# Patient Record
Sex: FEMALE | Race: WHITE | URBAN - METROPOLITAN AREA
[De-identification: names, ages, dates, MRNs, and addresses within clinical notes are randomized per-mention and may not be internally consistent; named-entity substitution may affect disease eponyms.]

---

## 2023-10-18 LAB
C. TRACHOMATIS, EXTERNAL RESULT: NEGATIVE
N. GONORRHOEAE, EXTERNAL RESULT: NEGATIVE

## 2023-11-09 LAB
ABO, EXTERNAL RESULT: NORMAL
HEP B, EXTERNAL RESULT: NEGATIVE
HEPATITIS C ANTIBODY, EXTERNAL RESULT: NEGATIVE
RH FACTOR, EXTERNAL RESULT: NEGATIVE
RUBELLA TITER, EXTERNAL RESULT: NORMAL
T. PALLIDUM (SYPHILIS) ANTIBODY, EXTERNAL RESULT: NON REACTIVE

## 2024-05-03 LAB — GBS, EXTERNAL RESULT: POSITIVE

## 2024-05-30 ENCOUNTER — HOSPITAL ENCOUNTER (INPATIENT)
Age: 29
LOS: 2 days | Discharge: HOME | End: 2024-06-01
Attending: OBSTETRICS & GYNECOLOGY | Admitting: OBSTETRICS & GYNECOLOGY
Payer: COMMERCIAL

## 2024-05-30 ENCOUNTER — APPOINTMENT (OUTPATIENT)
Dept: LABOR AND DELIVERY | Age: 29
End: 2024-05-30
Payer: COMMERCIAL

## 2024-05-30 ENCOUNTER — ANESTHESIA EVENT (OUTPATIENT)
Dept: LABOR AND DELIVERY | Age: 29
End: 2024-05-30
Payer: COMMERCIAL

## 2024-05-30 ENCOUNTER — ANESTHESIA (OUTPATIENT)
Dept: LABOR AND DELIVERY | Age: 29
End: 2024-05-30
Payer: COMMERCIAL

## 2024-05-30 LAB
ABO + RH BLD: NORMAL
AMPHETAMINES UR QL SCN>1000 NG/ML: NORMAL
ANTIBODY SCREEN: NORMAL
BARBITURATES UR QL SCN>200 NG/ML: NORMAL
BASOPHILS # BLD: 0 K/UL (ref 0–0.2)
BASOPHILS NFR BLD: 0.1 %
BENZODIAZ UR QL SCN>200 NG/ML: NORMAL
BLD GP AB SCN SERPL QL: NORMAL
BUPRENORPHINE+NOR UR QL SCN: NORMAL
CANNABINOIDS UR QL SCN>50 NG/ML: NORMAL
COCAINE UR QL SCN: NORMAL
DEPRECATED RDW RBC AUTO: 13.9 % (ref 12.4–15.4)
DRUG SCREEN COMMENT UR-IMP: NORMAL
EOSINOPHIL # BLD: 0.1 K/UL (ref 0–0.6)
EOSINOPHIL NFR BLD: 1.1 %
FENTANYL SCREEN, URINE: NORMAL
HCT VFR BLD AUTO: 35.4 % (ref 36–48)
HGB BLD-MCNC: 12 G/DL (ref 12–16)
LYMPHOCYTES # BLD: 1.7 K/UL (ref 1–5.1)
LYMPHOCYTES NFR BLD: 15.7 %
MCH RBC QN AUTO: 30.2 PG (ref 26–34)
MCHC RBC AUTO-ENTMCNC: 33.9 G/DL (ref 31–36)
MCV RBC AUTO: 89 FL (ref 80–100)
METHADONE UR QL SCN>300 NG/ML: NORMAL
MONOCYTES # BLD: 0.9 K/UL (ref 0–1.3)
MONOCYTES NFR BLD: 8.4 %
NEUTROPHILS # BLD: 8 K/UL (ref 1.7–7.7)
NEUTROPHILS NFR BLD: 74.7 %
OPIATES UR QL SCN>300 NG/ML: NORMAL
OXYCODONE UR QL SCN: NORMAL
PCP UR QL SCN>25 NG/ML: NORMAL
PH UR STRIP: 6 [PH]
PLATELET # BLD AUTO: 237 K/UL (ref 135–450)
PMV BLD AUTO: 7.9 FL (ref 5–10.5)
RBC # BLD AUTO: 3.98 M/UL (ref 4–5.2)
WBC # BLD AUTO: 10.7 K/UL (ref 4–11)

## 2024-05-30 PROCEDURE — 6360000002 HC RX W HCPCS: Performed by: OBSTETRICS & GYNECOLOGY

## 2024-05-30 PROCEDURE — 86850 RBC ANTIBODY SCREEN: CPT

## 2024-05-30 PROCEDURE — 86900 BLOOD TYPING SEROLOGIC ABO: CPT

## 2024-05-30 PROCEDURE — 2500000003 HC RX 250 WO HCPCS: Performed by: NURSE ANESTHETIST, CERTIFIED REGISTERED

## 2024-05-30 PROCEDURE — 86901 BLOOD TYPING SEROLOGIC RH(D): CPT

## 2024-05-30 PROCEDURE — 6360000002 HC RX W HCPCS: Performed by: NURSE ANESTHETIST, CERTIFIED REGISTERED

## 2024-05-30 PROCEDURE — 86780 TREPONEMA PALLIDUM: CPT

## 2024-05-30 PROCEDURE — 1220000000 HC SEMI PRIVATE OB R&B

## 2024-05-30 PROCEDURE — 85025 COMPLETE CBC W/AUTO DIFF WBC: CPT

## 2024-05-30 PROCEDURE — 2580000003 HC RX 258: Performed by: OBSTETRICS & GYNECOLOGY

## 2024-05-30 PROCEDURE — 80307 DRUG TEST PRSMV CHEM ANLYZR: CPT

## 2024-05-30 RX ORDER — METHYLERGONOVINE MALEATE 0.2 MG/ML
200 INJECTION INTRAVENOUS PRN
Status: DISCONTINUED | OUTPATIENT
Start: 2024-05-30 | End: 2024-05-31

## 2024-05-30 RX ORDER — FLUTICASONE PROPIONATE 50 MCG
1 SPRAY, SUSPENSION (ML) NASAL DAILY
COMMUNITY

## 2024-05-30 RX ORDER — ONDANSETRON 2 MG/ML
4 INJECTION INTRAMUSCULAR; INTRAVENOUS EVERY 6 HOURS PRN
Status: DISCONTINUED | OUTPATIENT
Start: 2024-05-30 | End: 2024-05-31

## 2024-05-30 RX ORDER — BUPIVACAINE HYDROCHLORIDE 2.5 MG/ML
INJECTION, SOLUTION EPIDURAL; INFILTRATION; INTRACAUDAL
Status: COMPLETED
Start: 2024-05-30 | End: 2024-05-30

## 2024-05-30 RX ORDER — ONDANSETRON 4 MG/1
4 TABLET, ORALLY DISINTEGRATING ORAL EVERY 6 HOURS PRN
Status: DISCONTINUED | OUTPATIENT
Start: 2024-05-30 | End: 2024-05-31

## 2024-05-30 RX ORDER — BUPIVACAINE HYDROCHLORIDE 2.5 MG/ML
INJECTION, SOLUTION EPIDURAL; INFILTRATION; INTRACAUDAL PRN
Status: DISCONTINUED | OUTPATIENT
Start: 2024-05-30 | End: 2024-05-31 | Stop reason: SDUPTHER

## 2024-05-30 RX ORDER — SODIUM CHLORIDE 9 MG/ML
25 INJECTION, SOLUTION INTRAVENOUS PRN
Status: DISCONTINUED | OUTPATIENT
Start: 2024-05-30 | End: 2024-05-31

## 2024-05-30 RX ORDER — TRANEXAMIC ACID 10 MG/ML
1000 INJECTION, SOLUTION INTRAVENOUS
Status: DISCONTINUED | OUTPATIENT
Start: 2024-05-30 | End: 2024-05-31

## 2024-05-30 RX ORDER — CARBOPROST TROMETHAMINE 250 UG/ML
250 INJECTION, SOLUTION INTRAMUSCULAR PRN
Status: DISCONTINUED | OUTPATIENT
Start: 2024-05-30 | End: 2024-05-31

## 2024-05-30 RX ORDER — SODIUM CHLORIDE, SODIUM LACTATE, POTASSIUM CHLORIDE, CALCIUM CHLORIDE 600; 310; 30; 20 MG/100ML; MG/100ML; MG/100ML; MG/100ML
INJECTION, SOLUTION INTRAVENOUS CONTINUOUS
Status: DISCONTINUED | OUTPATIENT
Start: 2024-05-30 | End: 2024-05-31

## 2024-05-30 RX ORDER — SODIUM CHLORIDE 0.9 % (FLUSH) 0.9 %
5-40 SYRINGE (ML) INJECTION PRN
Status: DISCONTINUED | OUTPATIENT
Start: 2024-05-30 | End: 2024-05-31

## 2024-05-30 RX ORDER — TERBUTALINE SULFATE 1 MG/ML
0.25 INJECTION, SOLUTION SUBCUTANEOUS
Status: DISCONTINUED | OUTPATIENT
Start: 2024-05-30 | End: 2024-05-31

## 2024-05-30 RX ORDER — SODIUM CHLORIDE, SODIUM LACTATE, POTASSIUM CHLORIDE, AND CALCIUM CHLORIDE .6; .31; .03; .02 G/100ML; G/100ML; G/100ML; G/100ML
1000 INJECTION, SOLUTION INTRAVENOUS PRN
Status: DISCONTINUED | OUTPATIENT
Start: 2024-05-30 | End: 2024-05-31

## 2024-05-30 RX ORDER — DOCUSATE SODIUM 100 MG/1
100 CAPSULE, LIQUID FILLED ORAL 2 TIMES DAILY
Status: DISCONTINUED | OUTPATIENT
Start: 2024-05-30 | End: 2024-05-31

## 2024-05-30 RX ORDER — SODIUM CHLORIDE 0.9 % (FLUSH) 0.9 %
5-40 SYRINGE (ML) INJECTION EVERY 12 HOURS SCHEDULED
Status: DISCONTINUED | OUTPATIENT
Start: 2024-05-30 | End: 2024-05-31

## 2024-05-30 RX ORDER — SODIUM CHLORIDE, SODIUM LACTATE, POTASSIUM CHLORIDE, AND CALCIUM CHLORIDE .6; .31; .03; .02 G/100ML; G/100ML; G/100ML; G/100ML
500 INJECTION, SOLUTION INTRAVENOUS PRN
Status: DISCONTINUED | OUTPATIENT
Start: 2024-05-30 | End: 2024-05-31

## 2024-05-30 RX ORDER — MISOPROSTOL 100 UG/1
400 TABLET ORAL PRN
Status: DISCONTINUED | OUTPATIENT
Start: 2024-05-30 | End: 2024-05-31

## 2024-05-30 RX ADMIN — SODIUM CHLORIDE, POTASSIUM CHLORIDE, SODIUM LACTATE AND CALCIUM CHLORIDE: 600; 310; 30; 20 INJECTION, SOLUTION INTRAVENOUS at 08:58

## 2024-05-30 RX ADMIN — Medication 1 MILLI-UNITS/MIN: at 09:34

## 2024-05-30 RX ADMIN — Medication 2.5 MILLION UNITS: at 16:31

## 2024-05-30 RX ADMIN — DEXTROSE MONOHYDRATE 5 MILLION UNITS: 5 INJECTION INTRAVENOUS at 08:58

## 2024-05-30 RX ADMIN — BUPIVACAINE HYDROCHLORIDE 1 ML: 2.5 INJECTION, SOLUTION EPIDURAL; INFILTRATION; INTRACAUDAL; PERINEURAL at 22:35

## 2024-05-30 RX ADMIN — Medication 2.5 MILLION UNITS: at 12:51

## 2024-05-30 RX ADMIN — Medication 15 ML/HR: at 22:39

## 2024-05-30 RX ADMIN — Medication 2.5 MILLION UNITS: at 20:32

## 2024-05-30 NOTE — H&P
Department of Obstetrics and Gynecology   Obstetrics History and Physical        CHIEF COMPLAINT:  induction    HISTORY OF PRESENT ILLNESS:      The patient is a 28 y.o. female at 40w2d.  OB History          3    Para   1    Term   1            AB   1    Living             SAB   1    IAB        Ectopic        Molar        Multiple        Live Births                Patient presents with a chief complaint as above and is being admitted for induction    GBS positive     Rh neg- rhogam 3/8/24      Estimated Due Date: Estimated Date of Delivery: 24        PAST OB HISTORY:  OB History          3    Para   1    Term   1            AB   1    Living             SAB   1    IAB        Ectopic        Molar        Multiple        Live Births                    Past Medical History:        Diagnosis Date    Transient synovitis, right ankle and foot 2019     Past Surgical History:        Procedure Laterality Date    ANTERIOR CRUCIATE LIGAMENT REPAIR Bilateral     WISDOM TOOTH EXTRACTION       Allergies:  Patient has no known allergies.    Social History:    Social History     Socioeconomic History    Marital status: Single     Spouse name: Not on file    Number of children: Not on file    Years of education: Not on file    Highest education level: Not on file   Occupational History    Not on file   Tobacco Use    Smoking status: Never    Smokeless tobacco: Never   Substance and Sexual Activity    Alcohol use: Not on file    Drug use: Not on file    Sexual activity: Not on file   Other Topics Concern    Not on file   Social History Narrative    Not on file     Social Determinants of Health     Financial Resource Strain: Not on file   Food Insecurity: No Food Insecurity (2024)    Hunger Vital Sign     Worried About Running Out of Food in the Last Year: Never true     Ran Out of Food in the Last Year: Never true   Transportation Needs: No Transportation Needs (2024)    PRAPARE -

## 2024-05-30 NOTE — PLAN OF CARE
Problem: Vaginal Birth or  Section  Goal: Fetal and maternal status remain reassuring during the birth process  Description:  Birth OB-Pregnancy care plan goal which identifies if the fetal and maternal status remain reassuring during the birth process  2024 by Marge Voss RN  Outcome: Progressing  2024 by Wanda Holguin RN  Outcome: Progressing     Problem: Pain  Goal: Verbalizes/displays adequate comfort level or baseline comfort level  2024 by Marge Voss RN  Outcome: Progressing  2024 by Wanda Holguin RN  Outcome: Progressing     Problem: Infection - Adult  Goal: Absence of infection at discharge  2024 by Wanda Holguin RN  Outcome: Progressing     Problem: Safety - Adult  Goal: Free from fall injury  2024 by Marge Voss RN  Outcome: Progressing  2024 by Wanda Holguin RN  Outcome: Progressing

## 2024-05-30 NOTE — PROGRESS NOTES
Getting uncomfortable with ctx   Vitals:    24 1829   BP: 108/69   Pulse: 74   Resp:    Temp:    SpO2:      Cat 1 FHTs   Ctx q2-4min   SVE 3-4/60/-2, AROM clear fluid    Pit at 7     29yo  at 40wk2d IOL   - continue pitocin titration     Dior Miller MD

## 2024-05-31 LAB — REAGIN+T PALLIDUM IGG+IGM SERPL-IMP: NORMAL

## 2024-05-31 PROCEDURE — 7200000001 HC VAGINAL DELIVERY

## 2024-05-31 PROCEDURE — 3E033VJ INTRODUCTION OF OTHER HORMONE INTO PERIPHERAL VEIN, PERCUTANEOUS APPROACH: ICD-10-PCS | Performed by: OBSTETRICS & GYNECOLOGY

## 2024-05-31 PROCEDURE — 2580000003 HC RX 258: Performed by: OBSTETRICS & GYNECOLOGY

## 2024-05-31 PROCEDURE — 2500000003 HC RX 250 WO HCPCS: Performed by: NURSE ANESTHETIST, CERTIFIED REGISTERED

## 2024-05-31 PROCEDURE — 1220000000 HC SEMI PRIVATE OB R&B

## 2024-05-31 PROCEDURE — 0KQM0ZZ REPAIR PERINEUM MUSCLE, OPEN APPROACH: ICD-10-PCS | Performed by: OBSTETRICS & GYNECOLOGY

## 2024-05-31 PROCEDURE — 51702 INSERT TEMP BLADDER CATH: CPT

## 2024-05-31 PROCEDURE — 6370000000 HC RX 637 (ALT 250 FOR IP): Performed by: OBSTETRICS & GYNECOLOGY

## 2024-05-31 PROCEDURE — 6360000002 HC RX W HCPCS: Performed by: OBSTETRICS & GYNECOLOGY

## 2024-05-31 PROCEDURE — 6360000002 HC RX W HCPCS: Performed by: NURSE ANESTHETIST, CERTIFIED REGISTERED

## 2024-05-31 PROCEDURE — 10907ZC DRAINAGE OF AMNIOTIC FLUID, THERAPEUTIC FROM PRODUCTS OF CONCEPTION, VIA NATURAL OR ARTIFICIAL OPENING: ICD-10-PCS | Performed by: OBSTETRICS & GYNECOLOGY

## 2024-05-31 RX ORDER — SIMETHICONE 80 MG
80 TABLET,CHEWABLE ORAL EVERY 6 HOURS PRN
Status: DISCONTINUED | OUTPATIENT
Start: 2024-05-31 | End: 2024-06-01 | Stop reason: HOSPADM

## 2024-05-31 RX ORDER — LIDOCAINE HYDROCHLORIDE 20 MG/ML
INJECTION, SOLUTION EPIDURAL; INFILTRATION; INTRACAUDAL; PERINEURAL PRN
Status: DISCONTINUED | OUTPATIENT
Start: 2024-05-31 | End: 2024-05-31 | Stop reason: SDUPTHER

## 2024-05-31 RX ORDER — FERROUS SULFATE 325(65) MG
325 TABLET ORAL 2 TIMES DAILY WITH MEALS
Status: DISCONTINUED | OUTPATIENT
Start: 2024-05-31 | End: 2024-06-01 | Stop reason: HOSPADM

## 2024-05-31 RX ORDER — LANOLIN 100 %
OINTMENT (GRAM) TOPICAL PRN
Status: DISCONTINUED | OUTPATIENT
Start: 2024-05-31 | End: 2024-06-01 | Stop reason: HOSPADM

## 2024-05-31 RX ORDER — SODIUM CHLORIDE, SODIUM LACTATE, POTASSIUM CHLORIDE, CALCIUM CHLORIDE 600; 310; 30; 20 MG/100ML; MG/100ML; MG/100ML; MG/100ML
INJECTION, SOLUTION INTRAVENOUS CONTINUOUS
Status: DISCONTINUED | OUTPATIENT
Start: 2024-05-31 | End: 2024-06-01 | Stop reason: HOSPADM

## 2024-05-31 RX ORDER — SODIUM CHLORIDE 9 MG/ML
INJECTION, SOLUTION INTRAVENOUS PRN
Status: DISCONTINUED | OUTPATIENT
Start: 2024-05-31 | End: 2024-06-01 | Stop reason: HOSPADM

## 2024-05-31 RX ORDER — DEXMEDETOMIDINE HYDROCHLORIDE 100 UG/ML
INJECTION, SOLUTION INTRAVENOUS PRN
Status: DISCONTINUED | OUTPATIENT
Start: 2024-05-31 | End: 2024-05-31 | Stop reason: SDUPTHER

## 2024-05-31 RX ORDER — DOCUSATE SODIUM 100 MG/1
100 CAPSULE, LIQUID FILLED ORAL 2 TIMES DAILY PRN
Status: DISCONTINUED | OUTPATIENT
Start: 2024-05-31 | End: 2024-06-01 | Stop reason: HOSPADM

## 2024-05-31 RX ORDER — SODIUM CHLORIDE 0.9 % (FLUSH) 0.9 %
5-40 SYRINGE (ML) INJECTION PRN
Status: DISCONTINUED | OUTPATIENT
Start: 2024-05-31 | End: 2024-06-01 | Stop reason: HOSPADM

## 2024-05-31 RX ORDER — IBUPROFEN 600 MG/1
600 TABLET ORAL EVERY 8 HOURS PRN
Status: COMPLETED | OUTPATIENT
Start: 2024-05-31 | End: 2024-05-31

## 2024-05-31 RX ORDER — BUPIVACAINE HYDROCHLORIDE 2.5 MG/ML
INJECTION, SOLUTION EPIDURAL; INFILTRATION; INTRACAUDAL PRN
Status: DISCONTINUED | OUTPATIENT
Start: 2024-05-31 | End: 2024-05-31 | Stop reason: SDUPTHER

## 2024-05-31 RX ORDER — OXYCODONE HYDROCHLORIDE 5 MG/1
5 TABLET ORAL EVERY 4 HOURS PRN
Status: DISCONTINUED | OUTPATIENT
Start: 2024-05-31 | End: 2024-06-01 | Stop reason: HOSPADM

## 2024-05-31 RX ORDER — SODIUM CHLORIDE 0.9 % (FLUSH) 0.9 %
5-40 SYRINGE (ML) INJECTION EVERY 12 HOURS SCHEDULED
Status: DISCONTINUED | OUTPATIENT
Start: 2024-05-31 | End: 2024-06-01 | Stop reason: HOSPADM

## 2024-05-31 RX ORDER — IBUPROFEN 600 MG/1
600 TABLET ORAL EVERY 6 HOURS PRN
Status: DISCONTINUED | OUTPATIENT
Start: 2024-05-31 | End: 2024-06-01 | Stop reason: HOSPADM

## 2024-05-31 RX ORDER — KETOROLAC TROMETHAMINE 30 MG/ML
30 INJECTION, SOLUTION INTRAMUSCULAR; INTRAVENOUS EVERY 6 HOURS PRN
Status: COMPLETED | OUTPATIENT
Start: 2024-05-31 | End: 2024-05-31

## 2024-05-31 RX ORDER — BUPIVACAINE HYDROCHLORIDE 5 MG/ML
INJECTION, SOLUTION EPIDURAL; INTRACAUDAL PRN
Status: DISCONTINUED | OUTPATIENT
Start: 2024-05-31 | End: 2024-05-31 | Stop reason: SDUPTHER

## 2024-05-31 RX ORDER — ACETAMINOPHEN 500 MG
1000 TABLET ORAL EVERY 8 HOURS PRN
Status: DISCONTINUED | OUTPATIENT
Start: 2024-05-31 | End: 2024-06-01 | Stop reason: HOSPADM

## 2024-05-31 RX ORDER — ONDANSETRON 2 MG/ML
4 INJECTION INTRAMUSCULAR; INTRAVENOUS EVERY 6 HOURS PRN
Status: DISCONTINUED | OUTPATIENT
Start: 2024-05-31 | End: 2024-06-01 | Stop reason: HOSPADM

## 2024-05-31 RX ORDER — OXYCODONE HYDROCHLORIDE 5 MG/1
10 TABLET ORAL EVERY 4 HOURS PRN
Status: DISCONTINUED | OUTPATIENT
Start: 2024-05-31 | End: 2024-06-01 | Stop reason: HOSPADM

## 2024-05-31 RX ADMIN — Medication 2.5 MILLION UNITS: at 04:30

## 2024-05-31 RX ADMIN — DEXMEDETOMIDINE HYDROCHLORIDE 20 MCG: 100 INJECTION, SOLUTION INTRAVENOUS at 02:05

## 2024-05-31 RX ADMIN — FERROUS SULFATE TAB 325 MG (65 MG ELEMENTAL FE) 325 MG: 325 (65 FE) TAB at 08:27

## 2024-05-31 RX ADMIN — Medication 2.5 MILLION UNITS: at 00:32

## 2024-05-31 RX ADMIN — DOCUSATE SODIUM 100 MG: 100 CAPSULE, LIQUID FILLED ORAL at 20:15

## 2024-05-31 RX ADMIN — SODIUM CHLORIDE, POTASSIUM CHLORIDE, SODIUM LACTATE AND CALCIUM CHLORIDE: 600; 310; 30; 20 INJECTION, SOLUTION INTRAVENOUS at 04:29

## 2024-05-31 RX ADMIN — Medication 87.3 MILLI-UNITS/MIN: at 06:29

## 2024-05-31 RX ADMIN — ACETAMINOPHEN 1000 MG: 500 TABLET ORAL at 12:28

## 2024-05-31 RX ADMIN — LIDOCAINE HYDROCHLORIDE 2 ML: 20 INJECTION, SOLUTION EPIDURAL; INFILTRATION; INTRACAUDAL; PERINEURAL at 02:05

## 2024-05-31 RX ADMIN — BUPIVACAINE HYDROCHLORIDE 3 ML: 2.5 INJECTION, SOLUTION EPIDURAL; INFILTRATION; INTRACAUDAL at 02:05

## 2024-05-31 RX ADMIN — IBUPROFEN 600 MG: 600 TABLET, FILM COATED ORAL at 16:25

## 2024-05-31 RX ADMIN — BUPIVACAINE HYDROCHLORIDE 3 ML: 5 INJECTION, SOLUTION EPIDURAL; INTRACAUDAL; PERINEURAL at 02:05

## 2024-05-31 RX ADMIN — Medication 166.7 ML: at 05:55

## 2024-05-31 RX ADMIN — IBUPROFEN 600 MG: 600 TABLET, FILM COATED ORAL at 08:27

## 2024-05-31 RX ADMIN — ACETAMINOPHEN 1000 MG: 500 TABLET ORAL at 20:15

## 2024-05-31 RX ADMIN — Medication 10 ML: at 20:15

## 2024-05-31 ASSESSMENT — PAIN SCALES - GENERAL
PAINLEVEL_OUTOF10: 2
PAINLEVEL_OUTOF10: 2
PAINLEVEL_OUTOF10: 0
PAINLEVEL_OUTOF10: 3

## 2024-05-31 ASSESSMENT — PAIN DESCRIPTION - LOCATION
LOCATION: VAGINA
LOCATION: ABDOMEN
LOCATION: PERINEUM

## 2024-05-31 ASSESSMENT — PAIN - FUNCTIONAL ASSESSMENT
PAIN_FUNCTIONAL_ASSESSMENT: ACTIVITIES ARE NOT PREVENTED

## 2024-05-31 ASSESSMENT — PAIN DESCRIPTION - DESCRIPTORS
DESCRIPTORS: SORE
DESCRIPTORS: CRAMPING
DESCRIPTORS: DISCOMFORT;DULL

## 2024-05-31 NOTE — FLOWSHEET NOTE
05/30/24 2212   Provider Notification   Reason for Communication   (requesting epi placement)   Name of Team Member Notified Marino CRNA   Method of Communication Call   Notification Time 2212

## 2024-05-31 NOTE — FLOWSHEET NOTE
05/31/24 0150 05/31/24 0152   Cervical Exam   Dilation (cm) 7  --    OB Examiner Rack RN  --    Provider Notification   Reason for Communication  --    (called for redose d/t pts pain)   Name of Team Member Notified  --  Marino WHITESIDE   Method of Communication  --  Call   Notification Time  --  0152

## 2024-05-31 NOTE — FLOWSHEET NOTE
05/30/24 2221 05/30/24 2225 05/30/24 2228   Maternal Vitals (MEW-T)   SpO2  --  98 % 94 %   Fetal Heart Rate   Mode  --   --   --    Interventions   (CRNA at bedside)  --   --       05/30/24 2230 05/30/24 2233   Maternal Vitals (MEW-T)   SpO2 97 %  --    Fetal Heart Rate   Mode External US  --    Interventions  --    (placed)     Pit stopped at 2228.  Bps initiated

## 2024-05-31 NOTE — LACTATION NOTE
This note was copied from a baby's chart.  LACTATION CONSULTATION  Initial Lactation Consult:  Referred by: Lactation consultant follow up     Name: Eris Barajas       MRN: [id number]         YOB: 2025   Time of Birth: 5:52 AM   Gestational age: Gestational Age: 40w3d   Birth Weight: Birth Weight: 3.801 kg (8 lb 6.1 oz) Most Recent Weight: Weight: 3.801 kg (8 lb 6.1 oz) (Filed from Delivery Summary)   Weight Change from Birth: 0%           Maternal Assessment:  Maternal Data:  Information for the patient's mother:  Poly Barajas [[id number]]   29 y.o.   /Para:   Information for the patient's mother:  Poly Barajas [[id number]]      Information for the patient's mother:  Poly Barajas [[id number]]   40w3d     Prenatal Breastfeeding Education: Prior Success in Breastfeeding     Prior Breastfeeding Experience:  for: 1 year    Breastfeeding Goal: Exclusively Breastfeed     Breast Assessment  Right Breast: Breasts not assessed this encounter       Left Breast: Breasts not assessed this encounter     Medications of Concern:None    Maternal Toxicology:   Information for the patient's mother:  Poly Barajas [[id number]]     Barbiturate Screen, Ur   Date Value Ref Range Status   2024 Neg Negative <200 ng/mL Final     Benzodiazepine Screen, Urine   Date Value Ref Range Status   2024 Neg Negative <200 ng/mL Final     Cannabinoid Scrn, Ur   Date Value Ref Range Status   2024 Neg Negative <50 ng/mL Final     Cocaine Metabolite Screen, Urine   Date Value Ref Range Status   2024 Neg Negative <300 ng/mL Final     Methadone Screen, Urine   Date Value Ref Range Status   2024 Neg Negative <300 ng/mL Final     pH, Urine   Date Value Ref Range Status   2024 6.0  Final     Comment:     Urine pH less than 5.0 or greater than 8.0 may indicate sample adulteration.  Another sample should be collected if

## 2024-05-31 NOTE — L&D DELIVERY NOTE
Department of Obstetrics and Gynecology  Spontaneous Vaginal Delivery Note         Pre-operative Diagnosis:   27yo  at 40wk3 IOL     Post-operative Diagnosis:  Living  infant(s) and Male    Procedure:  Spontaneous vaginal delivery    Surgeon:   Dr. Miller      Information for the patient's :  Eris Barajas [[id number]]   APGAR One: 9    Information for the patient's :  Eris Barajas [[id number]]   APGAR Five: 9     Information for the patient's :  Eris Barajas [[id number]]   Birth Weight: N/A     Anesthesia:  epidural anesthesia    Estimated blood loss:  300ml    Specimen:  Placenta not sent to pathology     Cord blood sent No    Complications:  none    Condition:  infant stable to general nursery and mother stable    Details of Procedure:   The patient is a 28 y.o. female at 40w3d   OB History          3    Para   1    Term   1            AB   1    Living   1         SAB   1    IAB        Ectopic        Molar        Multiple        Live Births   1             who was admitted for induction. She received the following interventions: ARBOW and IV Pitocin induction She was known to be GBS positive and did receive antibiotic prophylaxis. The patient progressed well,did receive an epidural, became complete and started to push. After pushing for less then  10 minutes, the fetal head was at the perineum and she had a  of a baby boy in OA positon, then the rest of the infant delivered atraumatically, placed on mother abdomen. Cord was clamped and cut after 1 minute delayed cord clamping. The delivery of the placenta was spontaneous. The perineum and vagina were explored and a second degree laceration was repaired in standard fashion.    Dior Miller MD

## 2024-05-31 NOTE — FLOWSHEET NOTE
05/30/24 2112   Cervical Exam   Dilation (cm) 5   Effacement 80   OB Examiner Rack RN     Performed per pts request

## 2024-05-31 NOTE — FLOWSHEET NOTE
05/30/24 2031   Maternal Vitals (MEW-T)   Temp 98.3 °F (36.8 °C)   Temp Source Oral   Pulse 73   Heart Rate Source Monitor   Respirations 18   /73   BP Method Automatic   Patient Position Sitting   SpO2 99 %   Pulse Oximeter Device Mode Intermittent   Pulse Oximeter Device Location Right;Finger   Level of Consciousness Alert   MEW-T Score 0   Labor Algorithm/Pain Intensity   Labor Algorithm/Pain Intensity Coping   Coping Able to relax between contractions;Rhythmic breathing;Patient states she is coping   Pain Stated Goal Desires no epidural   Pain Location Abdomen   Pain Description Contraction   Labor Non-Pharmaceutical Pain Interventions Ambulation/Increased Activity;Emotional support   Pain Assessment   Pain Assessment Labor Algorithm/Pain Intensity     PCN #4 initiated.

## 2024-05-31 NOTE — FLOWSHEET NOTE
05/30/24 2305   Cervical Exam   Dilation (cm) 5   Effacement 90   Station -2   Station (Labor Curve Graph) 7     Attempted straight cath (>2 hrs post last void) unable to advance.  Soler placed, urine return.

## 2024-05-31 NOTE — FLOWSHEET NOTE
Placenta out at 0555     05/31/24 0557   Recovery In & Out   Recovery Time In 0557   Maternal Vitals (MEW-T)   Temp 98.6 °F (37 °C)   Temp Source Oral   Pulse 93   Heart Rate Source Monitor   Respirations 18   BP (!) 107/53   BP Method Automatic   Patient Position Semi fowlers   SpO2 95 %   Pulse Oximeter Device Mode Intermittent   Pulse Oximeter Device Location Left;Finger   Level of Consciousness Alert

## 2024-05-31 NOTE — ANESTHESIA PRE PROCEDURE
Department of Anesthesiology  Preprocedure Note       Name:  Poly Barajas   Age:  28 y.o.  :  1995                                          MRN:  [id number]         Date:  2024      Surgeon: * No surgeons listed *    Procedure: * No procedures listed *    Medications prior to admission:   Prior to Admission medications    Medication Sig Start Date End Date Taking? Authorizing Provider   fluticasone (FLONASE) 50 MCG/ACT nasal spray 1 spray by Each Nostril route daily   Yes Tani Arthur MD   sulfamethoxazole-trimethoprim (BACTRIM DS;SEPTRA DS) 800-160 MG per tablet TK 1 T PO BID WF  Patient not taking: Reported on 2024 10/14/18   ProviderTani MD       Current medications:    Current Facility-Administered Medications   Medication Dose Route Frequency Provider Last Rate Last Admin    penicillin G potassium 2.5 million units in 0.9% sodium chloride 100 mL IVPB  2.5 Million Units IntraVENous Q4H Dior Miller  mL/hr at 24 2.5 Million Units at 24 203    lactated ringers IV soln infusion   IntraVENous Continuous Dior Miller  mL/hr at 24 2249 Rate Verify at 24 2249    lactated ringers bolus 500 mL  500 mL IntraVENous PRN Dior Miller MD        Or    lactated ringers bolus 1,000 mL  1,000 mL IntraVENous PRN Dior Miller MD        sodium chloride flush 0.9 % injection 5-40 mL  5-40 mL IntraVENous 2 times per day Dior Miller MD        sodium chloride flush 0.9 % injection 5-40 mL  5-40 mL IntraVENous PRN Dior Miller MD        0.9 % sodium chloride infusion  25 mL IntraVENous PRN Dior Miller MD        methylergonovine (METHERGINE) injection 200 mcg  200 mcg IntraMUSCular PRN Dior Miller MD        carboprost (HEMABATE) injection 250 mcg  250 mcg IntraMUSCular PRN Dior Miller MD        miSOPROStol (CYTOTEC) tablet 400 mcg  400 mcg Buccal PRN Dior Miller MD        tranexamic

## 2024-05-31 NOTE — PROGRESS NOTES
[facility]  Labor and Delivery   Post Partum Progress Note      SUBJECTIVE:  PPD 0 s/p  male this AM    OBJECTIVE:      Vitals:  Vitals:    24 0755   BP: (!) 95/53   Pulse: 63   Resp: 16   Temp: 98.2 °F (36.8 °C)   SpO2:         Fundus firm, normal lochia  Extremities normal      DATA:    CBC:    Lab Results   Component Value Date/Time    WBC 10.7 2024 08:00 AM    RBC 3.98 2024 08:00 AM    HGB 12.0 2024 08:00 AM    HCT 35.4 2024 08:00 AM    MCV 89.0 2024 08:00 AM    RDW 13.9 2024 08:00 AM     2024 08:00 AM       ASSESSMENT & PLAN:      PPD 0-doing well  PP care. CBC     KELSEY Madsen MD

## 2024-05-31 NOTE — FLOWSHEET NOTE
05/30/24 2345   Provider Notification   Reason for Communication Status Update   Name of Team Member Notified Angela DUBOSE   Treatment Team Role Attending Provider   Method of Communication Call   Response In department   Notification Time 2345     Aware of successful epi placement.   Pit stopped at 2228 after decreaseing rate to 3mu at 2205 d/t tachy ctx pattern.   Aware of ctx pattern tachy at times post IVF bolus, repositioning and pit stopped.   Discussed FHR strip including late decels.  Aware of lower BP post epi as well.     Also aware of most recent SVE.     No new orders at this time.

## 2024-05-31 NOTE — PLAN OF CARE
Problem: Postpartum  Goal: Experiences normal postpartum course  Description:  Postpartum OB-Pregnancy care plan goal which identifies if the mother is experiencing a normal postpartum course  2024 1547 by Poly Perrin RN  Outcome: Progressing  2024 1445 by Ivana Lerma RN  Outcome: Progressing  Goal: Appropriate maternal -  bonding  Description:  Postpartum OB-Pregnancy care plan goal which identifies if the mother and  are bonding appropriately  2024 1547 by Poly Perrin RN  Outcome: Progressing  2024 1445 by Ivana Lerma RN  Outcome: Progressing  Goal: Establishment of infant feeding pattern  Description:  Postpartum OB-Pregnancy care plan goal which identifies if the mother is establishing a feeding pattern with their   2024 1547 by Poly Perrin RN  Outcome: Progressing  2024 1445 by Ivana Lerma RN  Outcome: Progressing  Goal: Incisions, wounds, or drain sites healing without S/S of infection  2024 1547 by Poly Perrin RN  Outcome: Progressing  2024 1445 by Ivana Lerma RN  Outcome: Progressing     Problem: Safety - Adult  Goal: Free from fall injury  2024 1547 by Poly Perrin RN  Outcome: Progressing  2024 1445 by Ivana Lerma RN  Outcome: Progressing     Problem: Chronic Conditions and Co-morbidities  Goal: Patient's chronic conditions and co-morbidity symptoms are monitored and maintained or improved  2024 1547 by Poly Perrin RN  Outcome: Progressing  2024 1445 by Ivana Lerma RN  Outcome: Progressing

## 2024-05-31 NOTE — FLOWSHEET NOTE
05/31/24 0500   Fetal Heart Rate   Interventions   (repositioned to far left side)   Uterine Activity   UA Mode [place]   Cervical Exam   Dilation (cm) 8   Effacement 90   Station -1   Station (Labor Curve Graph) 6

## 2024-05-31 NOTE — FLOWSHEET NOTE
05/31/24 0541   Cervical Exam   Dilation (cm) 10   Dilation Complete Date 05/31/24   Dilation Complete Time 0541   Effacement 100   Station +1   Station (Labor Curve Graph) 4   Provider Notification   Name of Team Member Notified Angela DUBOSE   Treatment Team Role Attending Provider   Method of Communication Call   Response In department;En route   Notification Time 0541     Aware of above SVE as well as decels.     MD en route to room.     Room set up for delivery

## 2024-05-31 NOTE — FLOWSHEET NOTE
05/31/24 0713   Handoff   Communication Given Shift Handoff   Handoff Given To Nerissa RN   Handoff Received From Shanika RN   Handoff Communication At bedside   Time Handoff Given 0710

## 2024-05-31 NOTE — FLOWSHEET NOTE
05/30/24 2205 05/30/24 2210   Fetal Heart Rate   Interventions   (pit decreased to 3mu)  --    Provider Notification   Reason for Communication  --  Decels;Uterine Activity;Pain   Name of Team Member Notified  --  Angela DUBOSE   Treatment Team Role  --  Attending Provider   Method of Communication  --  Call   Response  --  In department   Notification Time  --  2210     Aware of pit decrease.   Discussed pts pain and requesting epi, order obtained.   Also aware of late decels , resolved post repositioning and fluid bolus.     Orders to turn pit off if tachy/decels persist.

## 2024-05-31 NOTE — FLOWSHEET NOTE
PCN # 6 initiated.   Ryder emptied.      05/31/24 0437 05/31/24 0440   Fetal Heart Rate   Interventions  --  Positioned on Left Side;Peanut Ball   Cervical Exam   Dilation (cm) 7  --    Effacement 90  --    Station -2  --    Station (Labor Curve Graph) 7  --    OB Examiner Rack RN  --

## 2024-05-31 NOTE — ANESTHESIA PROCEDURE NOTES
CSE Block    Patient location during procedure: OB  Start time: 5/30/2024 10:28 PM  End time: 5/30/2024 10:35 PM  Reason for block: labor epidural  Staffing  Performed: resident/CRNA   Anesthesiologist: Aj Castro MD  Resident/CRNA: Dakota Gaviria APRN - CRNA  Performed by: Dakota Gaviria APRN - CRNA  Authorized by: Aj Castro MD    CSE  Patient position: sitting  Prep: Betadine  Patient monitoring: continuous pulse ox and frequent blood pressure checks  Approach: midline  Provider prep: mask and sterile gloves  Spinal Needle  Needle type: pencil-tip   Needle gauge: 25 G  Needle length: 4.75 in  Epidural Needle  Injection technique: JESSY saline  Needle type: Tuohy   Needle gauge: 17 G  Needle length: 3.5 in  Location: lumbar (1-5)  Catheter  Catheter type: side hole  Catheter size: 19 G  Test dose: negative (3 cc of 1.5 % xylocaine with epi)  AssessmentT8  Hemodynamics: stable  Additional Notes  Pt. prepped and draped in sterile fashion. Skin wheal with 1% lidocaine. 17ga touhy needle to JESSY. 25 ga. Spinal needle per touhy. CSF visualized in hub and 1 cc of 0.25 % bupivacaine injected. Needle withdrawn and catheter threaded. Negative test dose. Catheter secured with sterile dressing.  Preanesthetic Checklist  Completed: patient identified, IV checked, risks and benefits discussed, equipment checked, pre-op evaluation, anesthesia consent given, oxygen available and monitors applied/VS acknowledged

## 2024-05-31 NOTE — FLOWSHEET NOTE
05/31/24 0547 05/31/24 0548   Fetal Heart Rate   Interventions   (MD in room)   (pushing initiated)

## 2024-06-01 VITALS
SYSTOLIC BLOOD PRESSURE: 111 MMHG | HEIGHT: 65 IN | TEMPERATURE: 98.3 F | RESPIRATION RATE: 18 BRPM | DIASTOLIC BLOOD PRESSURE: 73 MMHG | HEART RATE: 64 BPM | BODY MASS INDEX: 30.66 KG/M2 | OXYGEN SATURATION: 95 % | WEIGHT: 184 LBS

## 2024-06-01 LAB
DEPRECATED RDW RBC AUTO: 14.1 % (ref 12.4–15.4)
HCT VFR BLD AUTO: 31.3 % (ref 36–48)
HGB BLD-MCNC: 10.6 G/DL (ref 12–16)
MCH RBC QN AUTO: 30.4 PG (ref 26–34)
MCHC RBC AUTO-ENTMCNC: 33.8 G/DL (ref 31–36)
MCV RBC AUTO: 90 FL (ref 80–100)
PLATELET # BLD AUTO: 173 K/UL (ref 135–450)
PMV BLD AUTO: 7.7 FL (ref 5–10.5)
RBC # BLD AUTO: 3.48 M/UL (ref 4–5.2)
WBC # BLD AUTO: 10.9 K/UL (ref 4–11)

## 2024-06-01 PROCEDURE — 6370000000 HC RX 637 (ALT 250 FOR IP): Performed by: OBSTETRICS & GYNECOLOGY

## 2024-06-01 PROCEDURE — 85027 COMPLETE CBC AUTOMATED: CPT

## 2024-06-01 RX ADMIN — DOCUSATE SODIUM 100 MG: 100 CAPSULE, LIQUID FILLED ORAL at 08:28

## 2024-06-01 RX ADMIN — IBUPROFEN 600 MG: 600 TABLET, FILM COATED ORAL at 08:27

## 2024-06-01 RX ADMIN — IBUPROFEN 600 MG: 600 TABLET, FILM COATED ORAL at 00:27

## 2024-06-01 RX ADMIN — ACETAMINOPHEN 1000 MG: 500 TABLET ORAL at 05:36

## 2024-06-01 ASSESSMENT — PAIN SCALES - GENERAL
PAINLEVEL_OUTOF10: 0
PAINLEVEL_OUTOF10: 2

## 2024-06-01 ASSESSMENT — PAIN DESCRIPTION - DESCRIPTORS: DESCRIPTORS: CRAMPING

## 2024-06-01 ASSESSMENT — PAIN - FUNCTIONAL ASSESSMENT: PAIN_FUNCTIONAL_ASSESSMENT: ACTIVITIES ARE NOT PREVENTED

## 2024-06-01 ASSESSMENT — PAIN DESCRIPTION - LOCATION: LOCATION: ABDOMEN

## 2024-06-01 NOTE — DISCHARGE SUMMARY
Obstetrical Discharge Form    Gestational Age:40w3d    Antepartum complications: none    Date of Delivery:24    Type of Delivery: vaginal, spontaneous    Delivered By:  Dr. Miller         Baby:   Information for the patient's :  Eris Barajas [[id number]]      Anesthesia: Epidural    Intrapartum complications: None    Postpartum complications: none    Discharge Medication:      Medication List        ASK your doctor about these medications      fluticasone 50 MCG/ACT nasal spray  Commonly known as: FLONASE     sulfamethoxazole-trimethoprim 800-160 MG per tablet  Commonly known as: BACTRIM DS;SEPTRA DS               Discharge Date: 24    Condition on discharge: Stable    Plan:   Follow up in 6 week(s)  Reviewed discharge instructions and scripts     Dior Miller MD

## 2024-06-01 NOTE — PROGRESS NOTES
[facility]  Labor and Delivery   Post Partum Progress Note      SUBJECTIVE:  PPD#1 s/p   Doing well   Desires dc home today   Desires circ for male infant- reviewed procedure consent obtained    OBJECTIVE:      Vitals:  Vitals:    24 0541   BP: 104/69   Pulse: 64   Resp: 16   Temp: 97.7 °F (36.5 °C)   SpO2: 95%        Fundus firm, normal lochia  Extremities normal      DATA:    CBC:    Lab Results   Component Value Date/Time    WBC 10.9 2024 07:01 AM    RBC 3.48 2024 07:01 AM    HGB 10.6 2024 07:01 AM    HCT 31.3 2024 07:01 AM    MCV 90.0 2024 07:01 AM    RDW 14.1 2024 07:01 AM     2024 07:01 AM       ASSESSMENT & PLAN:      PPD#1 s/p   Dc home today, fu in office 4-6 weeks for pp visit     Dior Miller MD

## 2024-06-01 NOTE — LACTATION NOTE
This note was copied from a baby's chart.  LACTATION CONSULTATION      Follow-up Consult: Reason for Follow-up: latch assist      Name: Boy Poly Barajas       MRN: [id number]               YOB: 2025   Time of Birth: 5:52 AM   Gestational age: Gestational Age: 40w3d   Birth Weight: Birth Weight: 3.801 kg (8 lb 6.1 oz) Most Recent Weight: Weight: 3.801 kg (8 lb 6.1 oz) (Filed from Delivery Summary)   Weight Change from Birth: 0%            Maternal Assessment:      Maternal Data:   Information for the patient's mother:  Poly Barajas [[id number]]   29 y.o.    /Para:   Information for the patient's mother:  Poly Barajas [[id number]]        Information for the patient's mother:  Poly Barajas [[id number]]   40w3d          Breast Assessment  Right Breast: WDL  Right Nipple: Everts well   Right Areola: WDL   Right Nipple Comfort: sore  Right Nipple Integrity: Intact     Left Breast: WDL  Left Nipple: Everts well   Left Areola: WDL   Left Nipple Comfort: sore  Left Nipple Integrity: Sore and blistered      Intervention during consultation:     Interventions Performed:   Assisted with breastfeeding  and Education     Latch & Positioning: Assisted MOB with attempting latch to the left breast. Assisted in a cross cradle hold initially. Reviewed position and hold. Reviewed hand expression, nipple to nose, compression on the breast and bringing infant up and onto the breast. Infant able to latch however began with grunting. Infant placed upright on MOB's chest. Infant with no color change and no retractions. RN informed. Provided MOB with hydrogels. Once infant settled attempted latch again in a football hold on the left side. Reviewed position and hold. Infant again able to latch well with short to moderate coordinated suck bursts, long jaw motions and swallows. Infant with occasional need for tactile stimulation. Infant remained actively feeding at time of LC exit.

## 2024-06-01 NOTE — PLAN OF CARE
Problem: Postpartum  Goal: Experiences normal postpartum course  Description:  Postpartum OB-Pregnancy care plan goal which identifies if the mother is experiencing a normal postpartum course  2024 by Marge Voss RN  Outcome: Progressing  2024 1547 by Poly Perrin RN  Outcome: Progressing  2024 1445 by Ivana Lerma RN  Outcome: Progressing  Goal: Appropriate maternal -  bonding  Description:  Postpartum OB-Pregnancy care plan goal which identifies if the mother and  are bonding appropriately  2024 by Marge Voss RN  Outcome: Progressing  2024 1547 by Poly Perrin RN  Outcome: Progressing  2024 1445 by Ivana Lerma RN  Outcome: Progressing  Goal: Establishment of infant feeding pattern  Description:  Postpartum OB-Pregnancy care plan goal which identifies if the mother is establishing a feeding pattern with their   2024 by Marge Voss RN  Outcome: Progressing  2024 1547 by Poly Perrin RN  Outcome: Progressing  2024 1445 by Ivana Lerma RN  Outcome: Progressing  Goal: Incisions, wounds, or drain sites healing without S/S of infection  2024 1547 by Poly Perrin RN  Outcome: Progressing  2024 1445 by Ivana Lerma RN  Outcome: Progressing     Problem: Pain  Goal: Verbalizes/displays adequate comfort level or baseline comfort level  2024 by Marge Voss RN  Outcome: Progressing  2024 1547 by Poly Perrin RN  Outcome: Progressing  2024 1445 by Ivana Lerma RN  Outcome: Progressing     Problem: Infection - Adult  Goal: Absence of infection at discharge  2024 1547 by Poly Perrin RN  Outcome: Progressing  2024 1445 by Ivana Lerma RN  Outcome: Progressing  Goal: Absence of infection during hospitalization  2024 1547 by Poly Perrin RN  Outcome: Progressing  2024 1445 by Ivana Lerma

## 2024-06-01 NOTE — LACTATION NOTE
This note was copied from a baby's chart.  LACTATION CONSULTATION      Follow-up Consult: Reason for Follow-up: assess needs, education        Name: Boy Poly Barajas       MRN: [id number]               YOB: 2025   Time of Birth: 5:52 AM   Gestational age: Gestational Age: 40w3d   Birth Weight: Birth Weight: 3.801 kg (8 lb 6.1 oz) Most Recent Weight: Weight: 3.801 kg (8 lb 6.1 oz) (Filed from Delivery Summary)   Weight Change from Birth: 0%            Maternal Assessment:      Maternal Data:   Information for the patient's mother:  Poly Barajas [[id number]]   29 y.o.    /Para:   Information for the patient's mother:  Poly Barajas [[id number]]        Information for the patient's mother:  Poly Barajas [[id number]]   40w3d          Breast Assessment  Right Breast: WDL  Right Nipple: Everts well   Right Areola: WDL   Right Nipple Comfort: sore  Right Nipple Integrity: Intact    Left Breast: WDL  Left Nipple: Everts well   Left Areola: WDL   Left Nipple Comfort: sore  Left Nipple Integrity: Sore and blistered per MOB      Infant Assessment:    DOL: 13 hours       Feeding: Breastfeeding      Nipple Shield in Use: No     I&O adequacy:  Urine output: is established  Stool output: is established  Percent weight change from birthweight: 0%     Oral Assessment:   Oral assessment not completed at this time.      Birth Factors/Diagnosis that could create risk for breastfeeding:   Glucose: Yes  Glucoses: 53     Intervention during consultation:     Interventions Performed:   Assisted with breastfeeding  and Education     Latch & Positioning: MOB breastfeeding on the right side in a cradle hold upon LC entry to room. MOB asked to check latch. Appeared to be latched well however top lip not flanged out well. MOB reports that left nipple is blistered and more difficult to latch. Encouraged to call with next feeding to assist with latch to the left side.     Manual  Expression:  MOB states she understands     Bedside Breast Pump:   N/A    Breast Shield Size:   N/A    Amount of milk expressed:   N/A    Pump Arrangements:  Owns breast pump    Education:  Latch & positioning , Feeding frequency & feeding cues , Hand expression , and hydrogels           Action/Plan:  Breastfeed on cue and at least 8 times/24 hours, unlimited timing. May not nurse this often in the first 24 hours. Wake baby and offer breastfeeding if it has been 3 hours since the beginning of last feeding. Place infant skin to skin if infant will not breastfeed at 3 hours.   Hand express prior to latch to dominique nipple and entice infant to the breast.   It is important to use gentle stimulation during feeding to promote active eating. Offer both breasts at every feeding. Burp infant in between sides. Alternate which breast is used first.   Offer STS often while awake. Mother holding infant skin to skin between feedings will promote milk supply and allow infant to rest more deeply.   Maintain a feeding log until infant is gaining weight and seen by primary care physician.   Request breastfeeding assistance from LC or RN as needed.     Feeding Plan reviewed with: Parents     Response:   Verbalized understanding of education and instruction

## 2024-06-01 NOTE — DISCHARGE INSTRUCTIONS
Thank you for the opportunity to care for you and your family.    We hope that you are happy with the care we provided during your stay in the [facility]. We want to ensure that you have the help you need when you leave the hospital. If there is anything we can assist you with, please let us know.    Breastfeeding mothers may contact our lactation specialists with any problems or questions.  [facility] lact[facility] services phone number is [phone].  Leave a message and your call will be returned.    Please refer to the information provided in the postpartum care booklet.  The following are warning signs to remember.    Call 911 if you have:    Chest pain or pressure  Shortness of breath, even at rest  Thoughts of harming yourself or others  Seizures    Call your healthcare provider if you have:    Temperature of 100.4 degrees or higher  Stitches that are not healing        -- Swelling, bleeding, drainage, foul odor, redness or warmth in/around your           stitches, staples, or incision (scar)        -- Bad smelling blood or discharge from the vagina  Vaginal bleeding that has increased         -- Soaking through one pad in an hour        -- You are passing clots larger than the size of a lemon  Red, warm tender area(s) in your breast or calf  Headache that does not get better, even after taking medicine; or headache with vision changes    Remember to notify all healthcare providers from your date of delivery to up to one year after giving birth!    CARING FOR YOURSELF        DIET/ACTIVITY    Eat a well balanced diet focusing on foods high in fiber and protein.  Drink plenty of fluids, especially water.  To avoid constipation you may take a mild stool softener as recommended by your doctor or midwife.  Gradually increase your activity. Resume an exercise regime only after being advised by your doctor or midwife.  When sitting or lying down, keep your legs elevated to reduce swelling.  Avoid  lifting anything heavier than your baby or a gallon of milk.  Avoid driving for two weeks or while taking narcotics.  No sexual intercourse for 6 weeks, or until advised by your OB provider. Nothing in the vagina: intercourse, tampons or douching.  Be prepared to discuss family planning at your follow up OB visit.  If your feel tired and have a lack of energy, you may continue to take your prenatal vitamins.  Nap when your baby naps to catch on sleep.    EMOTIONS    You may feel correa, sad, teary and overwhelmed. Contact your OB provider if you think you may be showing signs of postpartum depression.  Please refer to the “Going Home” tab in your discharge binder.   If your baby will not stop crying, contact another adult to help or place the baby in its crib on its back and take a break. Never shake your baby!    JEREMY CARE     Vaginal bleeding will decrease in amount over the next few weeks. Cleanse your perineum from front to back using the jeremy-bottle after toileting until bleeding stops.  You will notice that as your activity increases, your flow may also increase. This is a sign that you need to rest more often. Call your OB provider if you are saturating more than 1 maxi pad an hour and resting does not help.  If used, stiches will dissolve in 4-6 weeks. To ease pain or swelling, use prescribed medications properly or use a sitz bath, if ordered.  Kegel exercises will help to restore bladder control.      BREAST CARE    FOR BREASTFEEDING MOMS:    If you become engorged, feeding may be more difficult or painful in 1 to 2 days. You may find it helpful to hand express some milk so that the infant can latch on more easily.   While breastfeeding continue to take your prenatal vitamins as directed.  Refer to the breastfeeding information in the discharge binder.      FOR NON-BREASTFEEDING MOMS:    You may apply ice packs to your breasts over your bra for 20 minutes at a time for comfort.  Do not express breast

## 2024-06-01 NOTE — FLOWSHEET NOTE
Postpartum and infant care teaching completed and copy given to patient who verbalized understanding of all teaching points and denies further questions. Patient plans to follow-up with OB Provider as instructed.    ID bands checked. Father's ID band and one of baby's ID bands removed and taped to the chart by RN.   Patient discharged home in stable condition accompanied by fob.   Discharged via wheelchair, holding baby in a rear facing car seat.

## 2024-06-01 NOTE — FLOWSHEET NOTE
Discharge Phone Call    Patient Name: Poly Barajas     OB Care Provider: Dior Miller MD Discharge Date: 2024    Disposition of baby:    Phone Number: [phone] (home)     Attempts to Contact:  Date:    Caller  Date:    Caller  Date:    Caller    Information for the patient's :  Eris Barajas [[id number]]   Delivery Method: Vaginal, Spontaneous     1.  Now that you are at home is your pain being well controlled?   Y/N   If no, instruct to call       provider.      2. Are you breastfeeding?    Y/N    Do you need any extra support from our lactation staff?      Y/N    If yes, provide number for lactation.  3. Have you made or already had your first appointment with the baby's doctor? Y/N   If no, do      you know when to schedule it?  Y/N    4. Have you scheduled your follow-up appointment?  Y/N  If no, do you know when to schedule       it?    Y/N   If no, they can find it on printed discharge instructions.  5. Did staff discuss safe sleep during your stay? Y/N   6. Did we explain things in a way you could understand?  Y/N  7. Were we respectful of your preferences for labor and birth and include you in the plan of       care?  Y/N  If no, please explain _______________________________________________  8. Is there anyone in particular you would like to mention who provided care for you? _______      _________________________________________________________________________     9. Were you given a Post-Birth Warning Signs handout?  Y/N  Do you have it somewhere      easily accessible?  Y/N  If no, please send them a copy and ask them to put it somewhere      easily found.  10. Have you been crying excessively, having anger or mood swings that feel out of control, or       feel like you can't cope with caring for yourself or baby? Y/N   If yes, they may be showing       signs of postpartum depression and should call provider. There is also a        depression test on page

## 2024-06-10 NOTE — ANESTHESIA POSTPROCEDURE EVALUATION
Department of Anesthesiology  Postprocedure Note    Patient: Poly Barajas  MRN: [id number]  YOB: 1995  Date of evaluation: 6/10/2024    Procedure Summary       Date: 05/30/24 Room / Location:     Anesthesia Start: 2219 Anesthesia Stop: 05/31/24 0552    Procedure: Labor Analgesia Diagnosis:     Scheduled Providers:  Responsible Provider: Aj Castro MD    Anesthesia Type: CSE ASA Status: 2 - Emergent            Anesthesia Type: No value filed.    Felisha Phase I: Felisha Score: 9    Felisha Phase II: Felisha Score: 9    Anesthesia Post Evaluation    Patient location during evaluation: PACU  Level of consciousness: awake  Airway patency: patent  Nausea & Vomiting: no vomiting  Cardiovascular status: blood pressure returned to baseline  Respiratory status: acceptable  Hydration status: stable  Pain management: adequate    No notable events documented.

## 2025-03-10 NOTE — FLOWSHEET NOTE
Medication: Celecoxib 200 mg passed protocol.   Last office visit date: 3/7/25  Next appointment scheduled?: Yes   Number of refills given: 1      NSAID Refill Protocol - 12 Month Protocol [id number]/2025 01:11 AM   Protocol Details Seen by prescribing provider or same department within the last 12 months or has a future appt in 3 months - IF FAILED PLEASE LOOK AT CHART REVIEW FOR LAST VISIT AND PROCEED ACCORDINGLY    eGFR greater than 29 within last 12 months looking at last value    AST less than 55 in past 12 months looking at last value    HGB greater than 10 and HCT greater than 30 in past 12 months looking at last value    Patient is less than 69 years old    Normal Potassium within last 12 months looking at last value    ALT less than 90 in past 12 months looking at last value    Medication (including dose and sig) on current meds list    Request is NOT for Ketorolac    Lab requirements -- IF CRITERIA FAILED REFER TO PROTOCOL DETAILS         Report received from Wanda SMITH to assume care.     Bedside introduction performed, whiteboard updated.        05/30/24 1917   Maternal Vitals (MEW-T)   Patient Position Lying left side   Labor Algorithm/Pain Intensity   Labor Algorithm/Pain Intensity Coping   Coping Patient states she is coping   Pain Stated Goal Desires no epidural   Pain Location Abdomen   Pain Description Contraction   Labor Non-Pharmaceutical Pain Interventions Birthing ball, peanut-shaped;Rest;Emotional support   Pain Assessment   Pain Assessment Labor Algorithm/Pain Intensity     Pt denies any other needs at this time.